# Patient Record
(demographics unavailable — no encounter records)

---

## 2024-10-23 NOTE — HISTORY OF PRESENT ILLNESS
[FreeTextEntry1] : DM type:1 Severity:improving Duration:since age 10   Associated symptoms or complications:none known  Modifying Factors:transfer of care from prior endocrinologist  Current regimen: tslim pump, dexcom g6, control IQ   Current control:GMI in low 7 range    PMH: hypothyroid  Pt. is an ICU nurse at [employer]. Needed an urgent insulin renewal

## 2024-10-23 NOTE — ASSESSMENT
[FreeTextEntry1] : DM type 1, on pump therapy, in fairly good control. Needs review by CDE and upgrade to g7 Appears euthyroid on replacement will update labs discussed future pregnancy issues

## 2025-01-16 NOTE — HISTORY OF PRESENT ILLNESS
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[employer]   T1DM  Severity:  well controlled Onset: since 10 years old, polyuria  Modifying Factors: on insulin pump for years- on and off  Humalog in pump- Tandem/DEXCOM Biggest barrier is bolusing before she eats Has issues with hyperglycemia a week before her mensutral cycle   Average glucose 180 GMI 7.6 SD 86  19% Very high 21% High 59% In range 1% Low 1% Very low  All pump settings scanned in   Eye exam: overdue for eye exam Foot exam: pedicures----does not see podiatrist  Kidney disease: denies Heart disease: denies   Weight: feels she is plateauing Diet:  Exercise: on her feet for work---2-3x a week pilates---walks dog Smoking : denies   HLD   Enjoys cheese  Hashimotos Synthroid 112 mcg PT ADMITS SHE RARELY TAKES IT  TSH 1.75

## 2025-01-16 NOTE — PHYSICAL EXAM
[Alert] : alert [Well Nourished] : well nourished [No Acute Distress] : no acute distress [Normal Sclera/Conjunctiva] : normal sclera/conjunctiva [Normal Hearing] : hearing was normal [Thyroid Not Enlarged] : the thyroid was not enlarged [Clear to Auscultation] : lungs were clear to auscultation bilaterally [Normal S1, S2] : normal S1 and S2 [Normal Rate] : heart rate was normal [No Edema] : no peripheral edema [Not Tender] : non-tender [Soft] : abdomen soft [Normal Gait] : normal gait [No Rash] : no rash [No Tremors] : no tremors [Oriented x3] : oriented to person, place, and time

## 2025-01-16 NOTE — REVIEW OF SYSTEMS
[Fatigue] : no fatigue [Recent Weight Gain (___ Lbs)] : no recent weight gain [Recent Weight Loss (___ Lbs)] : no recent weight loss [Visual Field Defect] : no visual field defect [Blurred Vision] : no blurred vision [Dysphagia] : no dysphagia [Neck Pain] : no neck pain [Dysphonia] : no dysphonia [Chest Pain] : no chest pain [Polyuria] : no polyuria [Polydipsia] : no polydipsia

## 2025-05-06 NOTE — HISTORY OF PRESENT ILLNESS
[FreeTextEntry1] : Interval history: Got   Not preventing pregnancy  Working on low carb diet - rarely blousing for meals  Went on her honeymoon to [facility] where weather was 95 degrees and she ran in 300s whole week- blames heat?  T1DM  Severity:  well controlled Onset: since 10 years old, polyuria  Modifying Factors: on insulin pump for years- on and off  Humalog in pump- Tandem/DEXCOM Biggest barrier is bolusing before she eats Has issues with hyperglycemia a week before her mensutral cycle   Average glucose 139 GMI 6.6 SD 58  5.8% Very high 14% High 78% In range 2.7% Low 0.3% Very low  All pump settings scanned in   Eye exam: 2/2025- denies DR Foot exam: pedicures----does not see podiatrist  Kidney disease: denies Heart disease: denies   Weight: lost 11 lbs since last visit Diet: working on low carb, high protein Exercise: on her feet for work---2-3x a week pilates---walks dog Smoking : denies   HLD  Enjoys cheese  Hashimotos Was not taking her LT4 for months and her TFTs were normal so she held her LT4 again for last 3 months and TFTs still normal Ok to maintain off LT4 unless she becomes pregnant TSH 2.12   Was wondering if she was a canidadate for GLP1- her BMI is not high enough

## 2025-05-06 NOTE — ASSESSMENT
[FreeTextEntry1] : T1DM -Pt DM control is good, no pump changes today. She does not bolus unless her meal has a carb count high enough to warrant a bolus.  -Continue DEXCOM use -Continue to watch diet/increase exercise as tolerated -Continue annual ophtho exams  HLD -Watch diet- encouraged low fat diet  Hashimotos -Pt admits she rarely takes her LT4 and her TFTs remain normal. She endorses if she does take her LT4 she feels more anxious with palpitations. Will hold LT4 at this time  -She did just get   and intends to try for pregnancy soon so she will likely need LT4 support at that time.  Briefly discussed GLP1 but pt endorses she would like to work on diet/exercise herself. She is no longer a candidate for glp1 due to her BMI  RTO 6 weeks MD(she has had this apt made for a while and doesnt want to change it)

## 2025-07-24 NOTE — HISTORY OF PRESENT ILLNESS
[FreeTextEntry1] : Interval history: Getting injections for severe DR No pregnancy plans at this time  T1DM  Severity:  well controlled Onset: since 10 years old, polyuria  Modifying Factors: on insulin pump for years- on and off  Humalog in pump- Tandem/DEXCOM Biggest barrier is bolusing before she eats Has issues with hyperglycemia a week before her mensutral cycle   Average glucose 142 GMI 6.7 SD 63  7.2% Very high 17% High 73% In range 2.8% Low 0.8% Very low  All pump settings scanned in   HGA1C 6.5- 7/2025 FS in office 153  Eye exam: 6/2025---severe DR-getting injections q6 weeks Foot exam: pedicures----does not see podiatrist  Kidney disease: denies Heart disease: denies   Weight: gained approx 11 lbs since last visit despite diet/exercise Diet: working on low carb, high protein Exercise: on her feet for work---2-3x a week pilates---walks dog Smoking : denies   C/0 hair less  HLD LDL higher than goal  Enjoys cheese  Hashimotos Was not taking her LT4 for months and her TFTs were normal so she held her LT4 again for last 3 months and TFTs still normal Ok to maintain off LT4 unless she becomes pregnant TSH WNL   Was wondering if she was a canidadate for GLP1- her BMI is not high enough  K+ 5.4 on labs- likely a lab error?

## 2025-07-24 NOTE — ASSESSMENT
[FreeTextEntry1] : T1DM -Pt DM control is good, no pump changes today. She does not bolus unless her meal has a carb count high enough to warrant a bolus. She does have room for improvement with her bolus timing, must bolus pre meal -Continue DEXCOM use -Continue to watch diet/increase exercise as tolerated -Continue annual ophtho exams  HLD -Watch diet- encouraged low fat diet  Hashimotos -Pt admits she rarely takes her LT4 and her TFTs remain normal. She endorses if she does take her LT4 she feels more anxious with palpitations. Has been off LT4 for many months and TFTs have been stable. Will hold LT4 at this time  -She did just get   and intends to try for pregnancy soon so she will likely need LT4 support at that time.  Briefly discussed GLP1 but pt endorses she would like to work on diet/exercise herself, especially that pregnancy is in the neat future.   Repeat K+ performed today, will call pt with results  RTO 3 months NP, 6 months MD

## 2025-07-24 NOTE — REVIEW OF SYSTEMS
[Fatigue] : no fatigue [Recent Weight Gain (___ Lbs)] : recent weight gain: [unfilled] lbs [Recent Weight Loss (___ Lbs)] : no recent weight loss [Visual Field Defect] : no visual field defect [Blurred Vision] : no blurred vision [Dysphagia] : no dysphagia [Neck Pain] : no neck pain [Dysphonia] : no dysphonia [Chest Pain] : no chest pain [Shortness Of Breath] : no shortness of breath [Constipation] : no constipation [Diarrhea] : no diarrhea [Polyuria] : no polyuria [Hair Loss] : hair loss [Polydipsia] : no polydipsia